# Patient Record
Sex: FEMALE | Race: WHITE | NOT HISPANIC OR LATINO
[De-identification: names, ages, dates, MRNs, and addresses within clinical notes are randomized per-mention and may not be internally consistent; named-entity substitution may affect disease eponyms.]

---

## 2024-04-24 ENCOUNTER — APPOINTMENT (OUTPATIENT)
Dept: PLASTIC SURGERY | Facility: CLINIC | Age: 19
End: 2024-04-24
Payer: COMMERCIAL

## 2024-04-24 VITALS — OXYGEN SATURATION: 97 % | DIASTOLIC BLOOD PRESSURE: 83 MMHG | HEART RATE: 83 BPM | SYSTOLIC BLOOD PRESSURE: 123 MMHG

## 2024-04-24 DIAGNOSIS — Z72.0 TOBACCO USE: ICD-10-CM

## 2024-04-24 PROBLEM — Z00.00 ENCOUNTER FOR PREVENTIVE HEALTH EXAMINATION: Status: ACTIVE | Noted: 2024-04-24

## 2024-04-24 PROCEDURE — 99203 OFFICE O/P NEW LOW 30 MIN: CPT

## 2024-04-24 RX ORDER — DULOXETINE HYDROCHLORIDE 60 MG/1
60 CAPSULE, DELAYED RELEASE PELLETS ORAL
Refills: 0 | Status: ACTIVE | COMMUNITY

## 2024-04-24 RX ORDER — NORETHINDRONE ACETATE AND ETHINYL ESTRADIOL, ETHINYL ESTRADIOL AND FERROUS FUMARATE 1MG-10(24)
KIT ORAL
Refills: 0 | Status: ACTIVE | COMMUNITY

## 2024-04-24 NOTE — HISTORY OF PRESENT ILLNESS
[FreeTextEntry1] : Patient had a recent injury o her right leg and subsequently noted a 1.6 cm pigmented lesion in the right pretibial area.  She was seen by her dermatologist and referred here for treatment.  She has no history of skin cancer and no family history of same.  She is otherwise in good health.  History vaping.

## 2024-04-24 NOTE — ASSESSMENT
[FreeTextEntry1] : A: New pigmented lesion in the right pretibial region.  Recommend excisional biopsy, and favor staged approach due to skin tenson in the area and subsequent scarring.  Reviewed the material risks,  benefits,  and alternatives with Ms. INGRAMJOSUE HARSH  , including no surgery, and she  understands and wishes to proceed.  Patient prefers local anesthesia.

## 2024-04-24 NOTE — PHYSICAL EXAM
[NI] : Normal [de-identified] : 1.6 cm two tone brown-red lesion on the right pretibial area, no surrounding erythema or purulence and no palpable adenopathy.

## 2024-04-24 NOTE — REASON FOR VISIT
[Consultation] : a consultation visit [FreeTextEntry1] : 19-year-old woman referred by her dermatologist for evaluation of a pigmented lesion in the right pretibial area.

## 2024-05-30 ENCOUNTER — NON-APPOINTMENT (OUTPATIENT)
Age: 19
End: 2024-05-30

## 2024-06-04 ENCOUNTER — APPOINTMENT (OUTPATIENT)
Dept: PLASTIC SURGERY | Facility: CLINIC | Age: 19
End: 2024-06-04
Payer: COMMERCIAL

## 2024-06-04 VITALS
DIASTOLIC BLOOD PRESSURE: 84 MMHG | TEMPERATURE: 98.3 F | SYSTOLIC BLOOD PRESSURE: 117 MMHG | OXYGEN SATURATION: 98 % | HEART RATE: 78 BPM

## 2024-06-04 PROCEDURE — 14020 TIS TRNFR S/A/L 10 SQ CM/<: CPT

## 2024-06-04 NOTE — PROCEDURE
[FreeTextEntry1] : Pigmented lesion right pretibial region  [FreeTextEntry2] : First of staged excision of a pigmented lesion in the right pre tibial area [FreeTextEntry3] : Xylocaine with epinephrine  [FreeTextEntry4] : 10 cc  [FreeTextEntry5] : none  [FreeTextEntry6] : The lesion was marked for first stage excision, and following a sterile prep and d rape, Xylocaine with epinephrine injected for local anesthesia.  Incision was made as marked, and the lesion was excised.  Hemostasis was assured, and flaps advanced and inset with Monocryl and nylon sutures.   Patient tolerated well.   [FreeTextEntry7] : Pigmented lesion right pretibial region

## 2024-06-04 NOTE — REASON FOR VISIT
[Procedure: _________] : a [unfilled] procedure visit [FreeTextEntry1] : Patient returns for first of staged excision of a pigmented lesion in the right pre tibial area

## 2024-06-04 NOTE — ASSESSMENT
[FreeTextEntry1] : A: Pigmented lesion right pretibial region  P: First of staged excision of a pigmented lesion in the right pretibial area Tolerated well, instructions reviewed.

## 2024-06-12 ENCOUNTER — APPOINTMENT (OUTPATIENT)
Dept: PLASTIC SURGERY | Facility: CLINIC | Age: 19
End: 2024-06-12
Payer: COMMERCIAL

## 2024-06-12 DIAGNOSIS — L81.9 DISORDER OF PIGMENTATION, UNSPECIFIED: ICD-10-CM

## 2024-06-12 PROCEDURE — 99024 POSTOP FOLLOW-UP VISIT: CPT

## 2024-06-12 NOTE — PHYSICAL EXAM
[NI] : Normal [de-identified] : right leg wound clean and well approximated, no erythema or purulence

## 2024-06-12 NOTE — ASSESSMENT
[FreeTextEntry1] : A: Doing well post operative P: Sutures removed and scar care reviewed.   Pathology pending molecular studies

## 2024-06-12 NOTE — REASON FOR VISIT
[Post Op: _________] : a [unfilled] post op visit [FreeTextEntry1] : Ms. JOSUE HOUSE  returns for suture removal, generally doing well with no complaints and no fevers or chills at home.

## 2024-06-18 ENCOUNTER — APPOINTMENT (OUTPATIENT)
Dept: PLASTIC SURGERY | Facility: CLINIC | Age: 19
End: 2024-06-18

## 2024-06-18 PROCEDURE — 12031 INTMD RPR S/A/T/EXT 2.5 CM/<: CPT | Mod: 79

## 2024-06-18 NOTE — PROCEDURE
[FreeTextEntry1] : Open wound right leg [FreeTextEntry2] : Debridement and intermediate closure 2.1 cm right leg wound  [FreeTextEntry3] : Xylocaine with epinephrine  [FreeTextEntry4] : minimal  [FreeTextEntry5] : none  [FreeTextEntry6] : Following a sterile prep and drape, Xylocaine with epinephrine was injected for local anesthesia.  The wound was then debrided, and hemostasis was assured.  Margins were then advanced and inset with nylon sutures, and sterile dressing placed.   Patient tolerated well.   [FreeTextEntry7] : none

## 2024-06-18 NOTE — REASON FOR VISIT
[Procedure: _________] : a [unfilled] procedure visit [FreeTextEntry1] : Patient bumped her foot earlier today, sustaining an open wound on the right leg, and presents now for reapir

## 2024-06-18 NOTE — ASSESSMENT
[FreeTextEntry1] : A: Open wound right leg, post traumatic P: Debridement and intermediate closure 2.1 cm right leg wound. Tolerated well, instructions reviewed.   4

## 2024-07-01 ENCOUNTER — APPOINTMENT (OUTPATIENT)
Dept: PLASTIC SURGERY | Facility: CLINIC | Age: 19
End: 2024-07-01

## 2024-07-02 ENCOUNTER — APPOINTMENT (OUTPATIENT)
Dept: PLASTIC SURGERY | Facility: CLINIC | Age: 19
End: 2024-07-02
Payer: COMMERCIAL

## 2024-07-02 DIAGNOSIS — S81.801S UNSPECIFIED OPEN WOUND, RIGHT LOWER LEG, SEQUELA: ICD-10-CM

## 2024-07-02 PROCEDURE — 99024 POSTOP FOLLOW-UP VISIT: CPT

## 2025-04-01 ENCOUNTER — RESULT REVIEW (OUTPATIENT)
Age: 20
End: 2025-04-01

## 2025-04-01 ENCOUNTER — APPOINTMENT (OUTPATIENT)
Dept: PLASTIC SURGERY | Facility: CLINIC | Age: 20
End: 2025-04-01
Payer: COMMERCIAL

## 2025-04-01 VITALS
SYSTOLIC BLOOD PRESSURE: 115 MMHG | OXYGEN SATURATION: 98 % | DIASTOLIC BLOOD PRESSURE: 74 MMHG | HEART RATE: 95 BPM | TEMPERATURE: 97.4 F

## 2025-04-01 VITALS — HEART RATE: 73 BPM | DIASTOLIC BLOOD PRESSURE: 59 MMHG | OXYGEN SATURATION: 96 % | SYSTOLIC BLOOD PRESSURE: 102 MMHG

## 2025-04-01 DIAGNOSIS — L81.9 DISORDER OF PIGMENTATION, UNSPECIFIED: ICD-10-CM

## 2025-04-01 PROCEDURE — 14020 TIS TRNFR S/A/L 10 SQ CM/<: CPT
